# Patient Record
Sex: FEMALE | Race: WHITE
[De-identification: names, ages, dates, MRNs, and addresses within clinical notes are randomized per-mention and may not be internally consistent; named-entity substitution may affect disease eponyms.]

---

## 2018-08-30 ENCOUNTER — HOSPITAL ENCOUNTER (EMERGENCY)
Dept: HOSPITAL 50 - VM.ED | Age: 48
LOS: 1 days | Discharge: HOME | End: 2018-08-31
Payer: COMMERCIAL

## 2018-08-30 DIAGNOSIS — R04.0: Primary | ICD-10-CM

## 2018-08-30 DIAGNOSIS — Z88.2: ICD-10-CM

## 2018-08-30 PROCEDURE — 96360 HYDRATION IV INFUSION INIT: CPT

## 2018-08-30 PROCEDURE — 99285 EMERGENCY DEPT VISIT HI MDM: CPT

## 2018-08-30 PROCEDURE — 30903 CONTROL OF NOSEBLEED: CPT

## 2018-08-30 PROCEDURE — 85610 PROTHROMBIN TIME: CPT

## 2018-08-30 PROCEDURE — 86140 C-REACTIVE PROTEIN: CPT

## 2018-08-30 PROCEDURE — 85025 COMPLETE CBC W/AUTO DIFF WBC: CPT

## 2018-08-30 PROCEDURE — 80053 COMPREHEN METABOLIC PANEL: CPT

## 2018-08-31 LAB
ANION GAP SERPL CALC-SCNC: 13.3 MMOL/L (ref 10–20)
CHLORIDE SERPL-SCNC: 97 MMOL/L (ref 98–107)
SODIUM SERPL-SCNC: 135 MMOL/L (ref 136–145)

## 2020-08-05 NOTE — EDM.PDOC
ED HPI GENERAL MEDICAL PROBLEM





- General


Chief Complaint: ENT Problem


Time Seen by Provider: 08/30/18 22:41


Source of Information: Reports: Patient


History Limitations: Reports: No Limitations





- History of Present Illness


INITIAL COMMENTS - FREE TEXT/NARRATIVE: 





Pt. presents to ER with complaints of epistaxis primarily from L nare. States 

that she is currently anticoagulated with coumadin, and is on her way back to 

[place], [place] from [facility] in [place], [place] following a 18 day admission for 

endocardititis. She currently has a continuous infusion of ampicillin for this. 

Pt. states that the bloody nose started approx. 30 min. before coming to the 

ER. She denies any chest pain or shortness of breath. No weakness.


She states that she has a bloody nose last night which was treated with a 

dissolvable hemostatic agent. She states that her INR this am was 4.5. She was 

treated with vit K and her coumadin was held.





Onset: Today


Onset Date: 08/30/18


Onset Time: 22:00


Location: Reports: Face





- Related Data


 Allergies











Allergy/AdvReac Type Severity Reaction Status Date / Time


 


Sulfa (Sulfonamide Allergy  Other Verified 08/30/18 22:55





Antibiotics)     











Home Meds: 


 Home Meds





. [Unable to Verify Home Med List]  08/31/18 [History]











ED ROS GENERAL





- Review of Systems


Review Of Systems: See Below


Constitutional: Reports: Malaise, Weakness, Fatigue, Diaphoresis


HEENT: Reports: Nosebleed


Respiratory: Reports: No Symptoms


Cardiovascular: Reports: No Symptoms


Endocrine: Reports: No Symptoms


GI/Abdominal: Reports: No Symptoms


: Reports: No Symptoms


Musculoskeletal: Reports: No Symptoms


Skin: Reports: No Symptoms


Neurological: Reports: No Symptoms


Psychiatric: Reports: No Symptoms


Hematologic/Lymphatic: Reports: No Symptoms


Immunologic: Reports: No Symptoms





ED EXAM, GENERAL





- Physical Exam


Exam: See Below


Exam Limited By: No Limitations


General Appearance: Alert, WD/WN, No Apparent Distress


Nose: Other (active bleeding from both nares)


Throat/Mouth: Normal Inspection, Normal Lips, Normal Teeth, Normal Gums, Normal 

Oropharynx, Normal Voice, No Airway Compromise


Head: Atraumatic, Normocephalic


Neck: Normal Inspection, Supple, Non-Tender, Full Range of Motion


Respiratory/Chest: No Respiratory Distress, Lungs Clear, Normal Breath Sounds, 

No Accessory Muscle Use, Chest Non-Tender


Cardiovascular: Normal Peripheral Pulses, Regular Rate, Rhythm, No Edema, No 

Gallop, No JVD, No Murmur, No Rub


Peripheral Pulses: 3+: Radial (R)


GI/Abdominal: Normal Bowel Sounds, Soft, Non-Tender, No Organomegaly, No 

Distention


 (Female) Exam: Deferred


Rectal (Female) Exam: Deferred


Back Exam: Normal Inspection, Full Range of Motion, NT


Extremities: Normal Inspection, Normal Range of Motion, Non-Tender, Normal 

Capillary Refill, No Pedal Edema


Neurological: Alert, Oriented, CN II-XII Intact, Normal Cognition, Normal Gait, 

Normal Reflexes, No Motor/Sensory Deficits


Psychiatric: Normal Affect, Normal Mood


Skin Exam: Warm, Dry, Intact, Normal Color, No Rash


Lymphatic: No Adenopathy





ED GENERAL MEDICAL PROCEDURES





- Additional/Other Procedure(s)


Other (Free Text) Procedure(s): 





7.5cm ant/post rhino rocket was placed in L nare without difficulty. Pt. 

reported that it did not feel as though blood was running down the back of her 

throat, and she was no longer actively bleeding from her nares.





Course





- Orders/Labs/Meds


Orders: 


 Active Orders 24 hr











 Category Date Time Status


 


 CBC WITH AUTO DIFF [HEME] Stat Lab  08/30/18 22:55 Ordered


 


 COMPREHENSIVE METABOLIC PN,CMP [CHEM] Stat Lab  08/30/18 22:55 Ordered


 


 CRP [C-REACTIVE PROTEIN] [CHEM] Stat Lab  08/30/18 22:55 Ordered


 


 INR,PT,PROTHROMBIN TIME [COAG] Stat Lab  08/30/18 22:55 Ordered


 


 MANUAL DIFFERENTIAL QA/NC [HEME] Stat Lab  08/30/18 23:50 Results











Labs: 


 Laboratory Tests











  08/30/18 Range/Units





  23:50 


 


WBC  6.0  (4.0-10.0)  x10^3/uL


 


RBC  2.27 L  (4.00-5.50)  x10^6/uL


 


Hgb  7.0 L*  (12.0-16.0)  g/dL


 


Hct  21.9 L  (33.0-47.0)  %


 


MCV  96.5 H  (78.0-93.0)  fL


 


MCH  30.8  (26.0-32.0)  pg


 


MCHC  32.0  (32.0-36.0)  g/dL


 


RDW Coeff of Edwar  16.1 H  (10.0-15.0)  %


 


Plt Count  477 H  (130-400)  x10^3/uL


 


Add Manual Diff  Yes  











Meds: 


Medications














Discontinued Medications














Generic Name Dose Route Start Last Admin





  Trade Name Daniela  PRN Reason Stop Dose Admin


 


Acetaminophen  650 mg  08/30/18 23:29  





  Tylenol  PO  08/30/18 23:30  





  NOW ONE   





     





     





     





     


 


Acetaminophen  1,000 mg  08/30/18 23:55  





  Tylenol Extra Strength  PO  08/30/18 23:56  





  ONETIME ONE   





     





     





     





     


 


Hydrocodone Bitart/Acetaminophen  1 tab  08/30/18 23:23  





  Norco 325-5 Mg  PO  08/30/18 23:24  





  ONETIME ONE   





     





     





     





     


 


Oxymetazoline HCl  1 ml  08/30/18 23:57  





  Afrin Original 0.05% Nasal Spray  DANYEL  08/30/18 23:58  





  ONETIME ONE   





     





     





     





     














- Re-Assessments/Exams


Free Text/Narrative Re-Assessment/Exam: 





08/30/18 23:00


Pt. was hypotensive on arrival with a blood pressure in the 70s/30s. IV access 

was established. After approx. 90 ml of LR, BP improved to 116/63. Pt. reported 

feeling much better, but did complain of pain associated with the nasal packing 

device. She was given tylenol 650mg PO. She remained stable in my care in the 

ER.








Departure





- Departure


Time of Disposition: 00:11


Disposition: Home, Self-Care 01


Clinical Impression: 


 Epistaxis








- Discharge Information


Instructions:  Nosebleed, Adult


Referrals: 


PCP,Not In Area [Primary Care Provider] - 


Forms:  ED Department Discharge


Additional Instructions: 


Home to rest.





Follow-up clinic tomorrow for recheck. You should have your hemoglobin and INR 

checked. It would be best if this rhino rocket is kept in place for at least 24 

hours, but it this can't be done on the weekend, you have have it removed 

tomorrow late afternoon.





Return to ER if you have any chest pain, shortness of breath, lightheadedness, 

or weakness.





- My Orders


Last 24 Hours: 


My Active Orders





08/30/18 22:55


CBC WITH AUTO DIFF [HEME] Stat 


COMPREHENSIVE METABOLIC PN,CMP [CHEM] Stat 


CRP [C-REACTIVE PROTEIN] [CHEM] Stat 


INR,PT,PROTHROMBIN TIME [COAG] Stat 





08/30/18 23:50


MANUAL DIFFERENTIAL QA/NC [HEME] Stat 














- Assessment/Plan


Last 24 Hours: 


My Active Orders





08/30/18 22:55


CBC WITH AUTO DIFF [HEME] Stat 


COMPREHENSIVE METABOLIC PN,CMP [CHEM] Stat 


CRP [C-REACTIVE PROTEIN] [CHEM] Stat 


INR,PT,PROTHROMBIN TIME [COAG] Stat 





08/30/18 23:50


MANUAL DIFFERENTIAL QA/NC [HEME] Stat 











Plan: 





Home to rest.





Follow-up clinic tomorrow for recheck. You should have your hemoglobin and INR 

checked. It would be best if this rhino rocket is kept in place for at least 24 

hours, but it this can't be done on the weekend, you have have it removed 

tomorrow late afternoon.





Return to ER if you have any chest pain, shortness of breath, lightheadedness, 

or weakness.
05-Aug-2020